# Patient Record
Sex: MALE | Race: WHITE | URBAN - METROPOLITAN AREA
[De-identification: names, ages, dates, MRNs, and addresses within clinical notes are randomized per-mention and may not be internally consistent; named-entity substitution may affect disease eponyms.]

---

## 2018-10-01 ENCOUNTER — OFFICE VISIT (OUTPATIENT)
Dept: INTERNAL MEDICINE CLINIC | Age: 33
End: 2018-10-01

## 2018-10-01 VITALS
OXYGEN SATURATION: 98 % | RESPIRATION RATE: 19 BRPM | DIASTOLIC BLOOD PRESSURE: 80 MMHG | SYSTOLIC BLOOD PRESSURE: 128 MMHG | WEIGHT: 173.2 LBS | HEART RATE: 72 BPM | TEMPERATURE: 98.6 F | BODY MASS INDEX: 24.79 KG/M2 | HEIGHT: 70 IN

## 2018-10-01 DIAGNOSIS — F41.9 ANXIETY: ICD-10-CM

## 2018-10-01 DIAGNOSIS — Z23 ENCOUNTER FOR IMMUNIZATION: ICD-10-CM

## 2018-10-01 DIAGNOSIS — Z76.89 ENCOUNTER TO ESTABLISH CARE WITH NEW DOCTOR: Primary | ICD-10-CM

## 2018-10-01 RX ORDER — BISMUTH SUBSALICYLATE 262 MG
1 TABLET,CHEWABLE ORAL DAILY
COMMUNITY

## 2018-10-01 NOTE — MR AVS SNAPSHOT
[address] 
[phone] Patient: Amado Lu MRN: [id number] FZS:[id number] Visit Information Date & Time Provider Department Dept. Phone Encounter #  
 10/1/2018  2:15 PM Neo Wells MD [facility] [phone] [id number] Follow-up Instructions Return in about 3 months (around 1/1/2019) for Full Physical - 30 minutes appointment. Upcoming Health Maintenance Date Due DTaP/Tdap/Td series (1 - Tdap) 7/2/2005 Influenza Age 5 to Adult 8/1/2018 Allergies as of 10/1/2018  Review Complete On: 10/1/2018 By: Neo Wells MD  
 No Known Allergies Current Immunizations  Never Reviewed Name Date Influenza Vaccine (Quad) PF  Incomplete Not reviewed this visit You Were Diagnosed With   
  
 Codes Comments Encounter to establish care with new doctor    -  Primary ICD-10-CM: Z76.89 
ICD-9-CM: V65.8 Encounter for immunization     ICD-10-CM: D74 ICD-9-CM: V03.89 Vitals BP Pulse Temp Resp Height(growth percentile) Weight(growth percentile) 128/80 (BP 1 Location: Right arm, BP Patient Position: Sitting) 72 98.6 °F (37 °C) (Oral) 19 5' 10.28\" (1.785 m) 173 lb 3.2 oz (78.6 kg) SpO2 BMI Smoking Status 98% 24.66 kg/m2 Never Smoker Vitals History BMI and BSA Data Body Mass Index Body Surface Area  
 24.66 kg/m 2 1.97 m 2 Preferred Pharmacy Pharmacy Name Phone [phone] Your Updated Medication List  
  
   
This list is accurate as of 10/1/18  2:57 PM.  Always use your most recent med list.  
  
  
  
  
 multivitamin tablet Commonly known as:  ONE A DAY Take 1 Tab by mouth daily. We Performed the Following CBC WITH AUTOMATED DIFF [00378 CPT(R)] HEMOGLOBIN A1C WITH EAG [22586 CPT(R)] INFLUENZA VIRUS VAC QUAD,SPLIT,PRESV FREE SYRINGE IM [id number] CPT(R)] LIPID PANEL [61175 CPT(R)] METABOLIC PANEL, COMPREHENSIVE [15966 CPT(R)] MO IMMUNIZ ADMIN,1 SINGLE/COMB VAC/TOXOID [id number] CPT(R)] TSH 3RD GENERATION [84451 CPT(R)] Follow-up Instructions Return in about 3 months (around 1/1/2019) for Full Physical - 30 minutes appointment. Introducing [facility] [place] [facility]! [facility] introduces [employer] patient portal. Now you can access parts of your medical record, email your doctor's office, and request medication refills online. 1. In your internet browser, go to [email or web address] 2. Click on the First Time User? Click Here link in the Sign In box. You will see the New Member Sign Up page. 3. Enter your [employer] Access Code exactly as it appears below. You will not need to use this code after youve completed the sign-up process. If you do not sign up before the expiration date, you must request a new code. · [employer] Access Code: [email or web address] Expires: 12/30/2018  2:24 PM 
 
4. Enter the last four digits of your Social Security Number (xxxx) and Date of Birth (mm/dd/yyyy) as indicated and click Submit. You will be taken to the next sign-up page. 5. Create a [employer] ID. This will be your [employer] login ID and cannot be changed, so think of one that is secure and easy to remember. 6. Create a [employer] password. You can change your password at any time. 7. Enter your Password Reset Question and Answer. This can be used at a later time if you forget your password. 8. Enter your e-mail address. You will receive e-mail notification when new information is available in [address] 9. Click Sign Up. You can now view and download portions of your medical record. 10. Click the Download Summary menu link to download a portable copy of your medical information.  
 
If you have questions, please visit the Frequently Asked Questions section of the [employer]. Remember, [employer] is NOT to be used for urgent needs. For medical emergencies, dial 911. Now available from your iPhone and Android! Please provide this summary of care documentation to your next provider. Your primary care clinician is listed as Alicia Crimes. If you have any questions after today's visit, please call [phone].

## 2018-10-01 NOTE — PROGRESS NOTES
Patient received flu vaccine in office today. Administered Fluarix 0.5 ml in left deltoid, IM. Pt tolerated well

## 2018-10-01 NOTE — PROGRESS NOTES
New Patient Evaluation Josue Woods is a 28 y.o. male. They are here to establish care with the group and me as a primary care provider. The patient had an anxiety issue which began several months ago. He had an ear infection at the time. The ear infection was treated and he had begun to feel much better. His anxiety has persisted and he is concerned about this as it does interfere with his daily life in some respects. He sprained his ankle while running in the spring. He otherwise has no musculoskeletal complaints. There are no active problems to display for this patient. Current Outpatient Prescriptions Medication Sig Dispense Refill  multivitamin (ONE A DAY) tablet Take 1 Tab by mouth daily. No Known Allergies No past medical history on file. No past surgical history on file. Family History Problem Relation Age of Onset  Heart Disease -- Had a bypass surgery Father  Heart Disease Paternal Grandfather Social History Substance Use Topics  Smoking status: Never Smoker  Smokeless tobacco: Never Used  Alcohol use Not on file Health Maintenance Topic Date Due  
 DTaP/Tdap/Td series (1 - Tdap) 07/02/2005  Influenza Age 5 to Adult  08/01/2018 Review of Systems Constitutional: Negative. Respiratory: Negative. Cardiovascular: Negative. Psychiatric/Behavioral: The patient is nervous/anxious. All other systems reviewed and are negative. Visit Vitals  /80 (BP 1 Location: Right arm, BP Patient Position: Sitting)  Pulse 72  Temp 98.6 °F (37 °C) (Oral)  Resp 19  
 Ht 5' 10.28\" (1.785 m)  Wt 173 lb 3.2 oz (78.6 kg)  SpO2 98%  BMI 24.66 kg/m2 Physical Exam  
Constitutional: No distress. Cardiovascular: Normal rate and regular rhythm. Pulmonary/Chest: Effort normal and breath sounds normal.  
 
 
 
 
ASSESSMENT/PLAN Diagnoses and all orders for this visit: 
 
 1. Encounter to establish care with new doctor 
-     CBC WITH AUTOMATED DIFF 
-     METABOLIC PANEL, COMPREHENSIVE 
-     TSH 3RD GENERATION 
-     LIPID PANEL 
-     HEMOGLOBIN A1C WITH EAG 2. Encounter for immunization 
-     INFLUENZA VIRUS VAC QUAD,SPLIT,PRESV FREE SYRINGE IM 
-     TN IMMUNIZ ADMIN,1 SINGLE/COMB VAC/TOXOID 3. Anxiety -discussed with the patient, we will begin nonpharmacologic attempts at managing anxiety for now (exercise). We will discuss this further when he returns for his physical. 
 
 
 
Follow-up Disposition: 
Return in about 3 months (around 1/1/2019) for Full Physical - 30 minutes appointment. 
 
-Discussed with the patient to continue the current plan of care. We will obtain baseline labwork and determine if any adjustments need to be done. We will also await the records of the previous PCP to ascertain further details of the patient's history. The patient agrees with and understands the plan of care. All questions have been answered.

## 2019-01-18 LAB
ALBUMIN SERPL-MCNC: 4.8 G/DL (ref 3.5–5.5)
ALBUMIN/GLOB SERPL: 1.8 {RATIO} (ref 1.2–2.2)
ALP SERPL-CCNC: 70 IU/L (ref 39–117)
ALT SERPL-CCNC: 15 IU/L (ref 0–44)
AST SERPL-CCNC: 21 IU/L (ref 0–40)
BASOPHILS # BLD AUTO: 0 X10E3/UL (ref 0–0.2)
BASOPHILS NFR BLD AUTO: 1 %
BILIRUB SERPL-MCNC: 0.8 MG/DL (ref 0–1.2)
BUN SERPL-MCNC: 14 MG/DL (ref 6–20)
BUN/CREAT SERPL: 14 (ref 9–20)
CALCIUM SERPL-MCNC: 9.2 MG/DL (ref 8.7–10.2)
CHLORIDE SERPL-SCNC: 102 MMOL/L (ref 96–106)
CHOLEST SERPL-MCNC: 170 MG/DL (ref 100–199)
CO2 SERPL-SCNC: 26 MMOL/L (ref 20–29)
CREAT SERPL-MCNC: 0.97 MG/DL (ref 0.76–1.27)
EOSINOPHIL # BLD AUTO: 0.2 X10E3/UL (ref 0–0.4)
EOSINOPHIL NFR BLD AUTO: 3 %
ERYTHROCYTE [DISTWIDTH] IN BLOOD BY AUTOMATED COUNT: 13.3 % (ref 12.3–15.4)
EST. AVERAGE GLUCOSE BLD GHB EST-MCNC: 94 MG/DL
GLOBULIN SER CALC-MCNC: 2.7 G/DL (ref 1.5–4.5)
GLUCOSE SERPL-MCNC: 86 MG/DL (ref 65–99)
HBA1C MFR BLD: 4.9 % (ref 4.8–5.6)
HCT VFR BLD AUTO: 45 % (ref 37.5–51)
HDLC SERPL-MCNC: 42 MG/DL
HGB BLD-MCNC: 15.4 G/DL (ref 13–17.7)
IMM GRANULOCYTES # BLD AUTO: 0 X10E3/UL (ref 0–0.1)
IMM GRANULOCYTES NFR BLD AUTO: 0 %
LDLC SERPL CALC-MCNC: 119 MG/DL (ref 0–99)
LYMPHOCYTES # BLD AUTO: 1.3 X10E3/UL (ref 0.7–3.1)
LYMPHOCYTES NFR BLD AUTO: 23 %
MCH RBC QN AUTO: 31.1 PG (ref 26.6–33)
MCHC RBC AUTO-ENTMCNC: 34.2 G/DL (ref 31.5–35.7)
MCV RBC AUTO: 91 FL (ref 79–97)
MONOCYTES # BLD AUTO: 0.3 X10E3/UL (ref 0.1–0.9)
MONOCYTES NFR BLD AUTO: 5 %
NEUTROPHILS # BLD AUTO: 3.8 X10E3/UL (ref 1.4–7)
NEUTROPHILS NFR BLD AUTO: 68 %
PLATELET # BLD AUTO: 202 X10E3/UL (ref 150–379)
POTASSIUM SERPL-SCNC: 4.1 MMOL/L (ref 3.5–5.2)
PROT SERPL-MCNC: 7.5 G/DL (ref 6–8.5)
RBC # BLD AUTO: 4.95 X10E6/UL (ref 4.14–5.8)
SODIUM SERPL-SCNC: 141 MMOL/L (ref 134–144)
TRIGL SERPL-MCNC: 47 MG/DL (ref 0–149)
TSH SERPL DL<=0.005 MIU/L-ACNC: 2.13 UIU/ML (ref 0.45–4.5)
VLDLC SERPL CALC-MCNC: 9 MG/DL (ref 5–40)
WBC # BLD AUTO: 5.5 X10E3/UL (ref 3.4–10.8)

## 2019-02-13 ENCOUNTER — OFFICE VISIT (OUTPATIENT)
Dept: INTERNAL MEDICINE CLINIC | Age: 34
End: 2019-02-13

## 2019-02-13 VITALS
HEART RATE: 55 BPM | OXYGEN SATURATION: 96 % | BODY MASS INDEX: 24.02 KG/M2 | SYSTOLIC BLOOD PRESSURE: 123 MMHG | HEIGHT: 70 IN | DIASTOLIC BLOOD PRESSURE: 90 MMHG | WEIGHT: 167.8 LBS | TEMPERATURE: 98.3 F | RESPIRATION RATE: 19 BRPM

## 2019-02-13 DIAGNOSIS — Z23 ENCOUNTER FOR IMMUNIZATION: ICD-10-CM

## 2019-02-13 DIAGNOSIS — Z00.00 WELL ADULT EXAM: Primary | ICD-10-CM

## 2019-02-13 NOTE — PATIENT INSTRUCTIONS
Vaccine Information Statement     Tdap (Tetanus, Diphtheria, Pertussis) Vaccine: What You Need to Know    Many Vaccine Information Statements are available in Swedish and other languages. See www.immunize.org/vis. Hojas de Información Sobre Vacunas están disponibles en español y en muchos otros idiomas. Visite [employer].si    1. Why get vaccinated? Tetanus, diphtheria, and pertussis are very serious diseases. Tdap vaccine can protect us from these diseases. And, Tdap vaccine given to pregnant women can protect  babies against pertussis. TETANUS (Lockjaw) is rare in the [facility] today. It causes painful muscle tightening and stiffness, usually all over the body.  It can lead to tightening of muscles in the head and neck so you cant open your mouth, swallow, or sometimes even breathe. Tetanus kills about 1 out of 10 people who are infected even after receiving the best medical care. DIPHTHERIA is also rare in the [facility] today. It can cause a thick coating to form in the back of the throat.  It can lead to breathing problems, heart failure, paralysis, and death. PERTUSSIS (Whooping Cough) causes severe coughing spells, which can cause difficulty breathing, vomiting, and disturbed sleep.  It can also lead to weight loss, incontinence, and rib fractures. Up to 2 in 100 adolescents and 5 in 100 adults with pertussis are hospitalized or have complications, which could include pneumonia or death. These diseases are caused by bacteria. Diphtheria and pertussis are spread from person to person through secretions from coughing or sneezing. Tetanus enters the body through cuts, scratches, or wounds. Before vaccines, as many as 200,000 cases of diphtheria, 200,000 cases of pertussis, and hundreds of cases of tetanus, were reported in the [place] each year.  Since vaccination began, reports of cases for tetanus and diphtheria have dropped by about 99% and for pertussis by about 80%. 2. Tdap vaccine    Tdap vaccine can protect adolescents and adults from tetanus, diphtheria, and pertussis. One dose of Tdap is routinely given at age 6 or 15. People who did not get Tdap at that age should get it as soon as possible. Tdap is especially important for health care professionals and anyone having close contact with a baby younger than 12 months. Pregnant women should get a dose of Tdap during every pregnancy, to protect the  from pertussis. Infants are most at risk for severe, life-threatening complications from pertussis. Another vaccine, called Td, protects against tetanus and diphtheria, but not pertussis. A Td booster should be given every 10 years. Tdap may be given as one of these boosters if you have never gotten Tdap before. Tdap may also be given after a severe cut or burn to prevent tetanus infection. Your doctor or the person giving you the vaccine can give you more information. Tdap may safely be given at the same time as other vaccines. 3. Some people should not get this vaccine     A person who has ever had a life-threatening allergic reaction after a previous dose of any diphtheria, tetanus or pertussis containing vaccine, OR has a severe allergy to any part of this vaccine, should not get Tdap vaccine. Tell the person giving the vaccine about any severe allergies.  Anyone who had coma or long repeated seizures within 7 days after a childhood dose of DTP or DTaP, or a previous dose of Tdap, should not get Tdap, unless a cause other than the vaccine was found. They can still get Td.  Talk to your doctor if you:  - have seizures or another nervous system problem,  - had severe pain or swelling after any vaccine containing diphtheria, tetanus or pertussis,   - ever had a condition called Guillain Barré Syndrome (GBS),  - arent feeling well on the day the shot is scheduled.     4. Risks    With any medicine, including vaccines, there is a chance of side effects. These are usually mild and go away on their own. Serious reactions are also possible but are rare. Most people who get Tdap vaccine do not have any problems with it. Mild Problems following Tdap  (Did not interfere with activities)   Pain where the shot was given (about 3 in 4 adolescents or 2 in 3 adults)   Redness or swelling where the shot was given (about 1 person in 5)   Mild fever of at least 100.4°F (up to about 1 in 25 adolescents or 1 in 100 adults)   Headache (about 3 or 4 people in 10)   Tiredness (about 1 person in 3 or 4)   Nausea, vomiting, diarrhea, stomach ache (up to 1 in 4 adolescents or 1 in 10 adults)   Chills,  sore joints (about 1 person in 10)   Body aches (about 1 person in 3 or 4)    Rash, swollen glands (uncommon)    Moderate Problems following Tdap  (Interfered with activities, but did not require medical attention)   Pain where the shot was given (up to 1 in 5 or 6)    Redness or swelling where the shot was given (up to about 1 in 16 adolescents or 1 in 12 adults)   Fever over 102°F (about 1 in 100 adolescents or 1 in 250 adults)   Headache (about 1 in 7 adolescents or 1 in 10 adults)   Nausea, vomiting, diarrhea, stomach ache (up to 1 or 3 people in 100)   Swelling of the entire arm where the shot was given (up to about 1 in 500). Severe Problems following Tdap  (Unable to perform usual activities; required medical attention)   Swelling, severe pain, bleeding, and redness in the arm where the shot was given (rare). Problems that could happen after any vaccine:     People sometimes faint after a medical procedure, including vaccination. Sitting or lying down for about 15 minutes can help prevent fainting, and injuries caused by a fall. Tell your doctor if you feel dizzy, or have vision changes or ringing in the ears.      Some people get severe pain in the shoulder and have difficulty moving the arm where a shot was given. This happens very rarely.  Any medication can cause a severe allergic reaction. Such reactions from a vaccine are very rare, estimated at fewer than 1 in a million doses, and would happen within a few minutes to a few hours after the vaccination. As with any medicine, there is a very remote chance of a vaccine causing a serious injury or death. The safety of vaccines is always being monitored. For more information, visit: www.cdc.gov/vaccinesafety/    5. What if there is a serious problem? What should I look for?  Look for anything that concerns you, such as signs of a severe allergic reaction, very high fever, or unusual behavior.  Signs of a severe allergic reaction can include hives, swelling of the face and throat, difficulty breathing, a fast heartbeat, dizziness, and weakness. These would usually start a few minutes to a few hours after the vaccination. What should I do?  If you think it is a severe allergic reaction or other emergency that cant wait, call 9-1-1 or get the person to the nearest hospital. Otherwise, call your doctor.  Afterward, the reaction should be reported to the Vaccine Adverse Event Reporting System (VAERS). Your doctor might file this report, or you can do it yourself through the VAERS web site at www.vaers. [facility].gov, or by calling 7-315.399.4407. 5app does not give medical advice. 6. The National Vaccine Injury Compensation Program    The [facility] Vaccine Injury Compensation Program (VICP) is a federal program that was created to compensate people who may have been injured by certain vaccines. Persons who believe they may have been injured by a vaccine can learn about the program and about filing a claim by calling 6-198.438.8458 or visiting the [employer] website at www.[facility].gov/vaccinecompensation. There is a time limit to file a claim for compensation. 7. How can I learn more?  Ask your doctor.  He or she can give you the vaccine package insert or suggest other sources of information.  Call your local or state health department.  Contact the Centers for Disease Control and Prevention (CDC):  - Call 7-992.754.9185 (1-800-CDC-INFO) or  - Visit CDCs website at www.cdc.gov/vaccines      Vaccine Information Statement   Tdap Vaccine  (2/24/2015)  [address] [id number]    Department of Health and Human Services  Centers for Disease Control and Prevention    Office Use Only

## 2019-02-13 NOTE — PROGRESS NOTES
Comprehensive Physical Examination    Jm Nicole is a 28 y.o. male. he presents for a comprehensive physical examination. The patient reports feeling well today. He has no acute complaints. He has had a history of anxiety at his last visit. He notes that with increased exercise this has improved. Reviewed labs, he has all normal labs. There are no active problems to display for this patient. Current Outpatient Medications   Medication Sig Dispense Refill    multivitamin (ONE A DAY) tablet Take 1 Tab by mouth daily. No Known Allergies  History reviewed. No pertinent past medical history. History reviewed. No pertinent surgical history. Family History   Problem Relation Age of Onset    Heart Disease Father     Heart Disease Paternal Grandfather      Social History     Tobacco Use    Smoking status: Never Smoker    Smokeless tobacco: Never Used   Substance Use Topics    Alcohol use: Not on file        Health Maintenance   Topic Date Due    DTaP/Tdap/Td series (1 - Tdap) 07/02/2005    Influenza Age 5 to Adult  Completed         Review of Systems   Constitutional: Negative. Respiratory: Negative. Gastrointestinal: Negative. Visit Vitals  /90 (BP 1 Location: Right arm, BP Patient Position: Sitting)   Pulse (!) 55   Temp 98.3 °F (36.8 °C) (Oral)   Resp 19   Ht 5' 10.28\" (1.785 m)   Wt 167 lb 12.8 oz (76.1 kg)   SpO2 96%   BMI 23.89 kg/m²       Physical Exam   Constitutional: No distress. Cardiovascular: Normal rate and regular rhythm. Abdominal: Soft. Bowel sounds are normal.         ASSESSMENT/PLAN  Diagnoses and all orders for this visit:    1. Well adult exam    2. Encounter for immunization  -     TETANUS, DIPHTHERIA TOXOIDS AND ACELLULAR PERTUSSIS VACCINE (TDAP), IN INDIVIDS. >=7, IM  -     IN IMMUNIZ ADMIN,1 SINGLE/COMB VAC/TOXOID        Follow-up Disposition:  Return in about 1 year (around 2/13/2020).

## 2019-02-13 NOTE — PROGRESS NOTES
Tevin Ordaz is a 28 y.o. male who presents for routine immunizations. He denies any symptoms , reactions or allergies that would exclude them from being immunized today. Risks and adverse reactions were discussed and the VIS was given to them. All questions were addressed. He was observed for 5 min post injection. There were no reactions observed.     Itzel Oscar

## 2020-02-17 ENCOUNTER — OFFICE VISIT (OUTPATIENT)
Dept: INTERNAL MEDICINE CLINIC | Age: 35
End: 2020-02-17

## 2020-02-17 VITALS
BODY MASS INDEX: 24.65 KG/M2 | RESPIRATION RATE: 16 BRPM | HEIGHT: 70 IN | WEIGHT: 172.2 LBS | OXYGEN SATURATION: 97 % | TEMPERATURE: 98.2 F | HEART RATE: 61 BPM | DIASTOLIC BLOOD PRESSURE: 64 MMHG | SYSTOLIC BLOOD PRESSURE: 106 MMHG

## 2020-02-17 DIAGNOSIS — Z00.00 WELL ADULT EXAM: Primary | ICD-10-CM

## 2020-02-17 DIAGNOSIS — I49.9 IRREGULAR HEART BEAT: ICD-10-CM

## 2020-02-17 NOTE — PROGRESS NOTES
Comprehensive Physical Examination    Charmayne Shiver is a 27 y.o. male. he presents for a comprehensive physical examination. The patient reports that he feels some random heart palpitation at times. He has noted this primarily when exercising (running). It has occurred about 5 times in the past year but he has noted this. He has no cough, no shortness of breath and no chest pain. He runs daily. He also notes some low back pain. Not severe and improving in recent days. There are no active problems to display for this patient. Current Outpatient Medications   Medication Sig Dispense Refill    multivitamin (ONE A DAY) tablet Take 1 Tab by mouth daily. No Known Allergies  History reviewed. No pertinent past medical history. History reviewed. No pertinent surgical history. Family History   Problem Relation Age of Onset    Heart Disease Father     Heart Disease Paternal Grandfather      Social History     Tobacco Use    Smoking status: Never Smoker    Smokeless tobacco: Never Used   Substance Use Topics    Alcohol use: Not on file        Health Maintenance   Topic Date Due    DTaP/Tdap/Td series (2 - Td) 02/13/2029    Influenza Age 5 to Adult  Completed    Pneumococcal 0-64 years  Aged Out         Review of Systems   Constitutional: Negative. Respiratory: Negative. Cardiovascular: Positive for palpitations. Genitourinary: Negative. Musculoskeletal: Negative. Visit Vitals  /64 (BP 1 Location: Right arm, BP Patient Position: Sitting)   Pulse 61   Temp 98.2 °F (36.8 °C) (Oral)   Resp 16   Ht 5' 10.24\" (1.784 m)   Wt 172 lb 3.2 oz (78.1 kg)   SpO2 97%   BMI 24.54 kg/m²       Physical Exam  Neck:      Musculoskeletal: Normal range of motion. Cardiovascular:      Rate and Rhythm: Normal rate and regular rhythm. Heart sounds: No murmur. Pulmonary:      Effort: Pulmonary effort is normal.      Breath sounds: Normal breath sounds.    Abdominal: General: Bowel sounds are normal.      Palpations: Abdomen is soft. Lymphadenopathy:      Cervical: No cervical adenopathy. EKG: Appears to be J-point abnormality    ASSESSMENT/PLAN  Diagnoses and all orders for this visit:    1. Well adult exam  -     CBC WITH AUTOMATED DIFF  -     METABOLIC PANEL, COMPREHENSIVE  -     TSH 3RD GENERATION  -     LIPID PANEL  -     HEMOGLOBIN A1C WITH EAG    2. Irregular heart beat - Unclear etiology for the present symptoms. I suggested he discuss with cardiology in order to have an opinion about the need for further testing. Referral given. -     AMB POC EKG ROUTINE W/ 12 LEADS, INTER & REP  -     REFERRAL TO CARDIOLOGY          Follow-up and Dispositions    · Return in about 1 year (around 2/17/2021).

## 2020-02-17 NOTE — PROGRESS NOTES
Chief Complaint   Patient presents with    Complete Physical     1. Have you been to the ER, urgent care clinic since your last visit? Hospitalized since your last visit? No    2. Have you seen or consulted any other health care providers outside of the [address] [place] [address] since your last visit? Include any pap smears or colon screening.  No

## 2021-02-24 ENCOUNTER — OFFICE VISIT (OUTPATIENT)
Dept: INTERNAL MEDICINE CLINIC | Age: 36
End: 2021-02-24
Payer: COMMERCIAL

## 2021-02-24 VITALS
WEIGHT: 172.4 LBS | TEMPERATURE: 97.5 F | OXYGEN SATURATION: 98 % | RESPIRATION RATE: 14 BRPM | HEIGHT: 70 IN | DIASTOLIC BLOOD PRESSURE: 80 MMHG | SYSTOLIC BLOOD PRESSURE: 100 MMHG | BODY MASS INDEX: 24.68 KG/M2 | HEART RATE: 71 BPM

## 2021-02-24 DIAGNOSIS — Z00.00 WELL ADULT EXAM: Primary | ICD-10-CM

## 2021-02-24 PROCEDURE — 99395 PREV VISIT EST AGE 18-39: CPT | Performed by: INTERNAL MEDICINE

## 2021-02-24 NOTE — PROGRESS NOTES
Chief Complaint   Patient presents with    Complete Physical     Reviewed record in preparation for visit and have obtained necessary documentation. Identified pt with two pt identifiers(name and ). Health Maintenance Due   Topic    Flu Vaccine (1)         Chief Complaint   Patient presents with    Complete Physical        Wt Readings from Last 3 Encounters:   21 172 lb 6.4 oz (78.2 kg)   20 172 lb 3.2 oz (78.1 kg)   19 167 lb 12.8 oz (76.1 kg)     Temp Readings from Last 3 Encounters:   21 97.5 °F (36.4 °C) (Temporal)   20 98.2 °F (36.8 °C) (Oral)   19 98.3 °F (36.8 °C) (Oral)     BP Readings from Last 3 Encounters:   21 100/80   20 106/64   19 123/90     Pulse Readings from Last 3 Encounters:   21 71   20 61   19 (!) 55           Learning Assessment:  :     Learning Assessment 10/1/2018   PRIMARY LEARNER Patient   HIGHEST LEVEL OF EDUCATION - PRIMARY LEARNER  > 4 YEARS OF COLLEGE   BARRIERS PRIMARY LEARNER NONE   PRIMARY LANGUAGE ENGLISH   LEARNER PREFERENCE PRIMARY DEMONSTRATION   ANSWERED BY pt   RELATIONSHIP SELF       Depression Screening:  :     3 most recent PHQ Screens 2021   Little interest or pleasure in doing things Not at all   Feeling down, depressed, irritable, or hopeless Not at all   Total Score PHQ 2 0       Fall Risk Assessment:  :     Fall Risk Assessment, last 12 mths 2021   Able to walk? Yes   Fall in past 12 months? 0   Do you feel unsteady? 0   Are you worried about falling 0       Abuse Screening:  :     Abuse Screening Questionnaire 10/1/2018   Do you ever feel afraid of your partner? N   Are you in a relationship with someone who physically or mentally threatens you? N   Is it safe for you to go home?  Y       Coordination of Care Questionnaire:  :     1) Have you been to an emergency room, urgent care clinic since your last visit? no   Hospitalized since your last visit? no             2) Have you seen or consulted any other health care providers outside of [address] [place] [address] since your last visit? no  (Include any pap smears or colon screenings in this section.)    3) Do you have an Advance Directive on file? no    4) Are you interested in receiving information on Advance Directives? NO      Patient is accompanied by self I have received verbal consent from Krista Burden to discuss any/all medical information while they are present in the room. Reviewed record  In preparation for visit and have obtained necessary documentation.

## 2021-03-05 NOTE — PROGRESS NOTES
Comprehensive Physical Examination    Prashanth Otto is a 38 y.o. male. he presents for a comprehensive physical examination. The pt reports feeling well. He has no complaints. He has been working on exercise and dietary modification. There are no active problems to display for this patient. Current Outpatient Medications   Medication Sig Dispense Refill    multivitamin (ONE A DAY) tablet Take 1 Tab by mouth daily. No Known Allergies  History reviewed. No pertinent past medical history. History reviewed. No pertinent surgical history. Family History   Problem Relation Age of Onset    Heart Disease Father     Heart Disease Paternal Grandfather      Social History     Tobacco Use    Smoking status: Never Smoker    Smokeless tobacco: Never Used   Substance Use Topics    Alcohol use: Yes     Alcohol/week: 6.0 standard drinks     Types: 6 Standard drinks or equivalent per week        Health Maintenance   Topic Date Due    Hepatitis C Screening  Never done    DTaP/Tdap/Td series (2 - Td) 02/13/2029    Flu Vaccine  Completed    Pneumococcal 0-64 years  Aged Out         Review of Systems   Constitutional: Negative. Respiratory: Negative. Cardiovascular: Negative. Genitourinary: Negative. Visit Vitals  /80 (BP 1 Location: Left arm, BP Patient Position: Sitting, BP Cuff Size: Large adult)   Pulse 71   Temp 97.5 °F (36.4 °C) (Temporal)   Resp 14   Ht 5' 10.24\" (1.784 m)   Wt 172 lb 6.4 oz (78.2 kg)   SpO2 98%   BMI 24.57 kg/m²       Physical Exam  Neck:      Musculoskeletal: Normal range of motion. Cardiovascular:      Rate and Rhythm: Normal rate and regular rhythm. Heart sounds: No murmur. Pulmonary:      Effort: Pulmonary effort is normal.      Breath sounds: Normal breath sounds. Abdominal:      General: Bowel sounds are normal.      Palpations: Abdomen is soft. Lymphadenopathy:      Cervical: No cervical adenopathy.            ASSESSMENT/PLAN  Diagnoses and all orders for this visit:    1. Well adult exam  -     CBC WITH AUTOMATED DIFF; Future  -     METABOLIC PANEL, COMPREHENSIVE; Future  -     LIPID PANEL; Future      Follow-up and Dispositions    · Return in about 1 year (around 2/24/2022) for Full Physical - 30 minutes appointment.

## 2025-04-28 SDOH — HEALTH STABILITY: PHYSICAL HEALTH: ON AVERAGE, HOW MANY DAYS PER WEEK DO YOU ENGAGE IN MODERATE TO STRENUOUS EXERCISE (LIKE A BRISK WALK)?: 4 DAYS

## 2025-04-28 SDOH — HEALTH STABILITY: PHYSICAL HEALTH: ON AVERAGE, HOW MANY MINUTES DO YOU ENGAGE IN EXERCISE AT THIS LEVEL?: 60 MIN

## 2025-04-29 ENCOUNTER — OFFICE VISIT (OUTPATIENT)
Facility: CLINIC | Age: 40
End: 2025-04-29

## 2025-04-29 VITALS
RESPIRATION RATE: 16 BRPM | BODY MASS INDEX: 24.08 KG/M2 | TEMPERATURE: 98.4 F | OXYGEN SATURATION: 99 % | WEIGHT: 172 LBS | DIASTOLIC BLOOD PRESSURE: 71 MMHG | HEART RATE: 50 BPM | HEIGHT: 71 IN | SYSTOLIC BLOOD PRESSURE: 114 MMHG

## 2025-04-29 DIAGNOSIS — Z11.59 NEED FOR HEPATITIS C SCREENING TEST: ICD-10-CM

## 2025-04-29 DIAGNOSIS — M75.22 BICEPS TENDINITIS ON LEFT: ICD-10-CM

## 2025-04-29 DIAGNOSIS — Z00.00 VISIT FOR WELL MAN HEALTH CHECK: Primary | ICD-10-CM

## 2025-04-29 DIAGNOSIS — R53.83 OTHER FATIGUE: ICD-10-CM

## 2025-04-29 DIAGNOSIS — Z00.00 VISIT FOR WELL MAN HEALTH CHECK: ICD-10-CM

## 2025-04-29 DIAGNOSIS — Z82.49 FAMILY HISTORY OF HEART DISEASE: ICD-10-CM

## 2025-04-29 DIAGNOSIS — R00.2 PALPITATIONS: ICD-10-CM

## 2025-04-29 SDOH — ECONOMIC STABILITY: FOOD INSECURITY: WITHIN THE PAST 12 MONTHS, THE FOOD YOU BOUGHT JUST DIDN'T LAST AND YOU DIDN'T HAVE MONEY TO GET MORE.: NEVER TRUE

## 2025-04-29 SDOH — ECONOMIC STABILITY: FOOD INSECURITY: WITHIN THE PAST 12 MONTHS, YOU WORRIED THAT YOUR FOOD WOULD RUN OUT BEFORE YOU GOT MONEY TO BUY MORE.: NEVER TRUE

## 2025-04-29 ASSESSMENT — PATIENT HEALTH QUESTIONNAIRE - PHQ9
SUM OF ALL RESPONSES TO PHQ QUESTIONS 1-9: 0
SUM OF ALL RESPONSES TO PHQ QUESTIONS 1-9: 0
2. FEELING DOWN, DEPRESSED OR HOPELESS: NOT AT ALL
SUM OF ALL RESPONSES TO PHQ QUESTIONS 1-9: 0
SUM OF ALL RESPONSES TO PHQ QUESTIONS 1-9: 0
1. LITTLE INTEREST OR PLEASURE IN DOING THINGS: NOT AT ALL

## 2025-04-29 NOTE — PROGRESS NOTES
Timing: Routine includes lunch at 12 PM and 6-7 hours of sleep per night.  - Severity: Intermittent stress lasting 1-2 months.    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    FAMILY HISTORY  Father on dialysis for 7 years due to infection causing kidney failure, not a transplant candidate. First heart attack at 35 or 37, likely due to smoking, poor diet, and stress. Paternal grandfather  of heart attack in late 60s or early 70s. Mother generally healthy.        Reviewed and agree with Nurse Note and duplicated in this note.  Reviewed PmHx, RxHx, FmHx, SocHx, AllgHx and updated and dated in the chart.    Family History   Problem Relation Age of Onset    Heart Disease Father     Heart Attack Father         40 years ago    Kidney Disease Father     Stroke Father     Heart Disease Paternal Grandfather      No past medical history on file.   Social History     Socioeconomic History    Marital status:    Tobacco Use    Smoking status: Never    Smokeless tobacco: Never   Vaping Use    Vaping status: Never Used   Substance and Sexual Activity    Alcohol use: Yes     Alcohol/week: 4.0 standard drinks of alcohol     Types: 3 Cans of beer, 1 Shots of liquor per week    Drug use: Never    Sexual activity: Yes     Partners: Female     Comment: With wife     Social Drivers of Health     Food Insecurity: No Food Insecurity (2025)    Hunger Vital Sign     Worried About Running Out of Food in the Last Year: Never true     Ran Out of Food in the Last Year: Never true   Transportation Needs: No Transportation Needs (2025)    PRAPARE - Transportation     Lack of Transportation (Medical): No     Lack of Transportation (Non-Medical): No   Physical Activity: Sufficiently Active (2025)    Exercise Vital Sign     Days of Exercise per Week: 4 days     Minutes of Exercise per Session: 60 min   Housing Stability: Low Risk  (2025)    Housing Stability Vital Sign     Unable to Pay for Housing in the

## 2025-04-30 ENCOUNTER — RESULTS FOLLOW-UP (OUTPATIENT)
Facility: CLINIC | Age: 40
End: 2025-04-30

## 2025-04-30 LAB
ALBUMIN SERPL-MCNC: 4.3 G/DL (ref 3.5–5)
ALBUMIN/GLOB SERPL: 1.7 (ref 1.1–2.2)
ALP SERPL-CCNC: 68 U/L (ref 45–117)
ALT SERPL-CCNC: 37 U/L (ref 12–78)
ANION GAP SERPL CALC-SCNC: 3 MMOL/L (ref 2–12)
AST SERPL-CCNC: 47 U/L (ref 15–37)
BASOPHILS # BLD: 0.04 K/UL (ref 0–0.1)
BASOPHILS NFR BLD: 0.7 % (ref 0–1)
BILIRUB SERPL-MCNC: 0.8 MG/DL (ref 0.2–1)
BUN SERPL-MCNC: 11 MG/DL (ref 6–20)
BUN/CREAT SERPL: 13 (ref 12–20)
CALCIUM SERPL-MCNC: 9.5 MG/DL (ref 8.5–10.1)
CHLORIDE SERPL-SCNC: 105 MMOL/L (ref 97–108)
CHOLEST SERPL-MCNC: 154 MG/DL
CO2 SERPL-SCNC: 30 MMOL/L (ref 21–32)
CREAT SERPL-MCNC: 0.86 MG/DL (ref 0.7–1.3)
DIFFERENTIAL METHOD BLD: NORMAL
EOSINOPHIL # BLD: 0.1 K/UL (ref 0–0.4)
EOSINOPHIL NFR BLD: 1.8 % (ref 0–7)
ERYTHROCYTE [DISTWIDTH] IN BLOOD BY AUTOMATED COUNT: 12.6 % (ref 11.5–14.5)
GLOBULIN SER CALC-MCNC: 2.5 G/DL (ref 2–4)
GLUCOSE SERPL-MCNC: 92 MG/DL (ref 65–100)
HCT VFR BLD AUTO: 42.6 % (ref 36.6–50.3)
HCV AB SER IA-ACNC: 0.05 INDEX
HCV AB SERPL QL IA: NONREACTIVE
HDLC SERPL-MCNC: 45 MG/DL
HDLC SERPL: 3.4 (ref 0–5)
HGB BLD-MCNC: 14.5 G/DL (ref 12.1–17)
HIV 1+2 AB+HIV1 P24 AG SERPL QL IA: NONREACTIVE
HIV 1/2 RESULT COMMENT: NORMAL
IMM GRANULOCYTES # BLD AUTO: 0.01 K/UL (ref 0–0.04)
IMM GRANULOCYTES NFR BLD AUTO: 0.2 % (ref 0–0.5)
LDLC SERPL CALC-MCNC: 76.8 MG/DL (ref 0–100)
LYMPHOCYTES # BLD: 1.33 K/UL (ref 0.8–3.5)
LYMPHOCYTES NFR BLD: 24.5 % (ref 12–49)
MCH RBC QN AUTO: 31.5 PG (ref 26–34)
MCHC RBC AUTO-ENTMCNC: 34 G/DL (ref 30–36.5)
MCV RBC AUTO: 92.4 FL (ref 80–99)
MONOCYTES # BLD: 0.39 K/UL (ref 0–1)
MONOCYTES NFR BLD: 7.2 % (ref 5–13)
NEUTS SEG # BLD: 3.56 K/UL (ref 1.8–8)
NEUTS SEG NFR BLD: 65.6 % (ref 32–75)
NRBC # BLD: 0 K/UL (ref 0–0.01)
NRBC BLD-RTO: 0 PER 100 WBC
PLATELET # BLD AUTO: 207 K/UL (ref 150–400)
PMV BLD AUTO: 10.9 FL (ref 8.9–12.9)
POTASSIUM SERPL-SCNC: 3.6 MMOL/L (ref 3.5–5.1)
PROT SERPL-MCNC: 6.8 G/DL (ref 6.4–8.2)
RBC # BLD AUTO: 4.61 M/UL (ref 4.1–5.7)
SODIUM SERPL-SCNC: 138 MMOL/L (ref 136–145)
TRIGL SERPL-MCNC: 161 MG/DL
TSH SERPL DL<=0.05 MIU/L-ACNC: 1.32 UIU/ML (ref 0.36–3.74)
VLDLC SERPL CALC-MCNC: 32.2 MG/DL
WBC # BLD AUTO: 5.4 K/UL (ref 4.1–11.1)

## 2025-07-07 ENCOUNTER — OFFICE VISIT (OUTPATIENT)
Age: 40
End: 2025-07-07
Payer: COMMERCIAL

## 2025-07-07 VITALS
BODY MASS INDEX: 24.08 KG/M2 | HEIGHT: 71 IN | WEIGHT: 172 LBS | DIASTOLIC BLOOD PRESSURE: 60 MMHG | OXYGEN SATURATION: 98 % | SYSTOLIC BLOOD PRESSURE: 110 MMHG | HEART RATE: 57 BPM | RESPIRATION RATE: 16 BRPM

## 2025-07-07 DIAGNOSIS — R07.9 CHEST PAIN, UNSPECIFIED TYPE: ICD-10-CM

## 2025-07-07 DIAGNOSIS — Z76.89 ESTABLISHING CARE WITH NEW DOCTOR, ENCOUNTER FOR: Primary | ICD-10-CM

## 2025-07-07 DIAGNOSIS — R00.2 PALPITATION: ICD-10-CM

## 2025-07-07 PROCEDURE — 93000 ELECTROCARDIOGRAM COMPLETE: CPT | Performed by: SPECIALIST

## 2025-07-07 PROCEDURE — 99204 OFFICE O/P NEW MOD 45 MIN: CPT | Performed by: SPECIALIST

## 2025-07-07 NOTE — PATIENT INSTRUCTIONS
You will be scheduled for a Treadmill Stress Echocardiogram Test after your appointment today.    Please wear comfortable clothing (shorts or pants with a shirt or blouse) and walking/athletic shoes.    Do not eat or drink anything, except water, for at least 2 hours prior to your test.    Follow up about 1 week after testing

## 2025-07-07 NOTE — PROGRESS NOTES
[facility]                                     DIVISION OF CARDIOLOGY                                                                             OFFICE NOTE                  DAVID GR M.D. , ANTHONY            [place]   1985  [id number]    Date/Time:  7/7/20258:04 AM      There were no vitals taken for this visit.       Wt Readings from Last 3 Encounters:   04/29/25 78 kg (172 lb)   02/24/21 78.2 kg (172 lb 6.4 oz)        Lab Results   Component Value Date    CHOL 154 04/29/2025    TRIG 161 (H) 04/29/2025    HDL 45 04/29/2025    LDL 76.8 04/29/2025    VLDL 32.2 04/29/2025    CHOLHDLRATIO 3.4 04/29/2025               SUBJECTIVE:  He has had issue in the last 3 to 4 years of possible palpitation with neck pressure lasting few seconds during the run this happens between 8-10 times a year.    He denies any presyncopal syncopal episode or other palpitation he does have an Apple Watch which has not showed any particular dysrhythmias.    No particular shortness of breath he is able to run 10 miles every weekend with no issues pretty good pace at 730 to 8-minute mile.       Assessment/Plan    1.  Neck pressure: Possibly related to PVC but I feel that we need to further evaluate make sure he does not have any particular underlying coronary disease.    This is mostly in relation to his strong family history for early coronary events.    His EKG today reveals sinus bradycardia normal intervals and no significant ST-T changes    Discussed options    Proceed with calcium score.    Proceed with stress echocardiogram.    At this time the 2 weeks Holter monitor will be put on the back burner given the infrequent episodes of palpitations    He is blood pressure is well-controlled.    His cholesterol is acceptable and closely followed by his primary care physician    We have discussed significance and implication of calcium score as well.    Otherwise we will see him back after

## 2025-08-12 ENCOUNTER — OFFICE VISIT (OUTPATIENT)
Age: 40
End: 2025-08-12
Payer: COMMERCIAL

## 2025-08-12 VITALS
HEART RATE: 53 BPM | SYSTOLIC BLOOD PRESSURE: 110 MMHG | RESPIRATION RATE: 16 BRPM | DIASTOLIC BLOOD PRESSURE: 70 MMHG | HEIGHT: 70 IN | BODY MASS INDEX: 24.2 KG/M2 | OXYGEN SATURATION: 99 % | WEIGHT: 169 LBS

## 2025-08-12 DIAGNOSIS — R00.2 PALPITATION: Primary | ICD-10-CM

## 2025-08-12 PROCEDURE — 99213 OFFICE O/P EST LOW 20 MIN: CPT | Performed by: SPECIALIST

## 2025-08-12 PROCEDURE — 93000 ELECTROCARDIOGRAM COMPLETE: CPT | Performed by: SPECIALIST
